# Patient Record
Sex: MALE | Race: WHITE | Employment: PART TIME | URBAN - METROPOLITAN AREA
[De-identification: names, ages, dates, MRNs, and addresses within clinical notes are randomized per-mention and may not be internally consistent; named-entity substitution may affect disease eponyms.]

---

## 2017-07-18 ENCOUNTER — OFFICE VISIT (OUTPATIENT)
Dept: ORTHOPEDIC SURGERY | Age: 22
End: 2017-07-18

## 2017-07-18 VITALS — BODY MASS INDEX: 25.74 KG/M2 | WEIGHT: 190.04 LBS | HEIGHT: 72 IN

## 2017-07-18 DIAGNOSIS — S43.102S: Primary | ICD-10-CM

## 2017-07-18 PROCEDURE — 99213 OFFICE O/P EST LOW 20 MIN: CPT | Performed by: ORTHOPAEDIC SURGERY

## 2017-07-18 PROCEDURE — 73020 X-RAY EXAM OF SHOULDER: CPT | Performed by: ORTHOPAEDIC SURGERY

## 2017-12-26 ENCOUNTER — TELEPHONE (OUTPATIENT)
Dept: ORTHOPEDIC SURGERY | Age: 22
End: 2017-12-26

## 2018-01-02 ENCOUNTER — OFFICE VISIT (OUTPATIENT)
Dept: ORTHOPEDIC SURGERY | Age: 23
End: 2018-01-02

## 2018-01-02 VITALS — HEIGHT: 72 IN | WEIGHT: 190.04 LBS | BODY MASS INDEX: 25.74 KG/M2

## 2018-01-02 DIAGNOSIS — S46.812A STRAIN OF LEFT TRAPEZIUS MUSCLE, INITIAL ENCOUNTER: ICD-10-CM

## 2018-01-02 DIAGNOSIS — M25.512 LEFT SHOULDER PAIN, UNSPECIFIED CHRONICITY: Primary | ICD-10-CM

## 2018-01-02 PROCEDURE — 99213 OFFICE O/P EST LOW 20 MIN: CPT | Performed by: PHYSICIAN ASSISTANT

## 2018-01-02 NOTE — PROGRESS NOTES
REFERRING PHYSICIAN: None    CHIEF COMPLAINT:  Left shoulder pain     HISTORY OF PRESENT ILLNESS:  Monik Cox a 45-year-old right-hand-dominant male presents today for evaluation of his left shoulder. He was last seen in the office on July 18, 2017, 1 year after a.c. joint reconstruction. He was doing well after that procedure until December 23, 2017 when he had a new injury while lifting weights. He reports that he was performing a bench press exercise and as he tried to place the  in the raft the bar fell and forced his left shoulder into extension and was unable abduction. Her hand night especially when lying on his left shoulder. He denies any numbness and tingling into his left hand. He has treated this with rest, ice, Advil, and activity modification and has had improvement of symptoms. REVIEW OF SYSTEMS:  Pertinent items are noted in the HPI. Review of systems was reviewed from Patient History Form dated on January 2, 2015 and available in the patient's chart under the Media tab. PHYSICAL EXAMINATION: Inspection of the left shoulder reveals warm, dry, intact skin. There is no adenopathy. There is a well-healed incision. The distal neurovascular exam is grossly intact. There is tenderness about the acromioclavicular joint. There is tenderness about the upper trapezius muscle. There is no tenderness over the coracoid. Range of motion reveals 110° of active forward elevation. He has difficulty position his hand behind his head. He has 20° of external rotation was arm the side. He has 4+ out of 5 strength in forward elevation and external rotation. He has discomfort and weakness with Whipple's test.  He has a negative belly press test.  He has a negative Yergason's test.  There is mild scapular winging with resisted range of motion. Examination of the contralateral shoulder reveals no atrophy or deformity. The skin is warm and dry. Range of motion is within normal limits.  There is

## 2018-01-04 ENCOUNTER — HOSPITAL ENCOUNTER (OUTPATIENT)
Dept: PHYSICAL THERAPY | Age: 23
Discharge: HOME | End: 2018-01-16
Admitting: ORTHOPAEDIC SURGERY

## 2018-01-04 NOTE — PLAN OF CARE
(30 days):   [x] Falls Risk assessed and no intervention required. [] Falls Risk assessed and Patient requires intervention due to being higher risk   TUG score (>12s at risk):     [] Falls education provided. G-Codes:  PT G-Codes  Functional Assessment Tool Used: Quick DASH   Score: 45%  Functional Limitation: Carrying, moving and handling objects  Carrying, Moving and Handling Objects Current Status (): At least 40 percent but less than 60 percent impaired, limited or restricted  Carrying, Moving and Handling Objects Goal Status (): At least 1 percent but less than 20 percent impaired, limited or restricted    ASSESSMENT:    Functional Impairments   []Noted spinal or UE joint hypomobility   []Noted spinal or UE joint hypermobility   [x]Decreased UE functional ROM   [x]Decreased UE functional strength   []Abnormal reflexes/sensation/myotomal/dermatomal deficits   [x]Decreased RC/scapular/core strength and neuromuscular control   []other:      Functional Activity Limitations (from functional questionnaire and intake)   []Reduced ability to tolerate prolonged functional positions   []Reduced ability or difficulty with changes of positions or transfers between positions   [x]Reduced ability to maintain good posture and demonstrate good body mechanics with sitting, bending, and lifting   [x] Reduced ability or tolerance with driving and/or computer work   [x]Reduced ability to sleep   [x]Reduced ability to perform lifting, reaching, carrying tasks   [x]Reduced ability to tolerate impact through UE   [x]Reduced ability to reach behind back   [x]Reduced ability to  or hold objects   [x]Reduced ability to throw or toss an object   []other:    Participation Restrictions   [x]Reduced participation in self care activities   [x]Reduced participation in home management activities   []Reduced participation in work activities   [x]Reduced participation in social activities.    [x]Reduced participation in

## 2018-01-04 NOTE — FLOWSHEET NOTE
Strength flexion 4 pain     ER      ABD 4     IR        RESTRICTIONS/PRECAUTIONS: history L AC joint reconstruction     Exercises/Interventions:   Therapeutic Ex Sets/reps Notes   UT stretch  1x5 20\"    Levator scap stretch  1x5 20\"    Lower cervical/mid thoracic stretch  1x5 20\"    Chin tuck  2x10                                                                                Manual Intervention     Strain/counterstrain L UT  10 min  Pt difficulty relaxing   L UT STM/trigger point release 10 min                              NMR re-education     Chin tuck  2x10                                          Pt ed: muscle strain, PT expectations, ice modality   Therapeutic Exercise and NMR EXR  [x] (08687) Provided verbal/tactile cueing for activities related to strengthening, flexibility, endurance, ROM  for improvements in scapular, scapulothoracic and UE control with self care, reaching, carrying, lifting, house/yardwork, driving/computer work.    [] (25193) Provided verbal/tactile cueing for activities related to improving balance, coordination, kinesthetic sense, posture, motor skill, proprioception  to assist with  scapular, scapulothoracic and UE control with self care, reaching, carrying, lifting, house/yardwork, driving/computer work. Therapeutic Activities:    [] (01428 or 69012) Provided verbal/tactile cueing for activities related to improving balance, coordination, kinesthetic sense, posture, motor skill, proprioception and motor activation to allow for proper function of scapular, scapulothoracic and UE control with self care, carrying, lifting, driving/computer work.      Home Exercise Program:    [x] (12778) Reviewed/Progressed HEP activities related to strengthening, flexibility, endurance, ROM of scapular, scapulothoracic and UE control with self care, reaching, carrying, lifting, house/yardwork, driving/computer work  [] (59457) Reviewed/Progressed HEP activities related to improving balance, coordination, kinesthetic sense, posture, motor skill, proprioception of scapular, scapulothoracic and UE control with self care, reaching, carrying, lifting, house/yardwork, driving/computer work      Manual Treatments:  PROM / STM / Oscillations-Mobs:  G-I, II, III, IV (PA's, Inf., Post.)  [x] (82137) Provided manual therapy to mobilize soft tissue/joints of cervical/CT, scapular GHJ and UE for the purpose of modulating pain, promoting relaxation,  increasing ROM, reducing/eliminating soft tissue swelling/inflammation/restriction, improving soft tissue extensibility and allowing for proper ROM for normal function with self care, reaching, carrying, lifting, house/yardwork, driving/computer work    Modalities:  CP/PMx 15 min     Charges:  Timed Code Treatment Minutes: 35   Total Treatment Minutes: 70     [x] EVAL (LOW) 88309 (typically 20 minutes face-to-face)  [] EVAL (MOD) 72915 (typically 30 minutes face-to-face)  [] EVAL (HIGH) 79617 (typically 45 minutes face-to-face)  [] RE-EVAL     [x] RL(22822) x  1   [] IONTO  [] NMR (79489) x      [] VASO  [x] Manual (22611) x  1    [] Other:  [] TA x       [] Mech Traction (53486)  [] ES(attended) (26787)      [x] ES (un) (78309):     GOALS:  Patient stated goal: Pt would like to return to playing sports and weight lifting with decreased pain and limitations from L shoulder    Therapist goals for Patient:   Short Term Goals: To be achieved in: 2 weeks  1. Independent in HEP and progression per patient tolerance, in order to prevent re-injury. 2. Patient will have a decrease in pain to facilitate improvement in movement, function, and ADLs as indicated by Functional Deficits. Long Term Goals: To be achieved in: 8 weeks   1. Disability index score of 20% or less for the DASH to assist with reaching prior level of function.    2. Patient will demonstrate increased L shoulder AROM to WNL without pain  to allow for proper joint functioning as indicated by patients Functional Deficits. 3. Patient will demonstrate an increase in Strength to 5/5 MMT in L UE to allow for proper functional mobility as indicated by patients Functional Deficits. 4. Patient will return to driving and sleeping  functional activities without increased symptoms or restriction. 5. PAtient will return to weight lifting and sports with decreased pain and limitations from his L shoulder. New or Updated Goals (if applicable):  [x] No change to goals established upon initial eval/last progress note:  New Goals:    Progression Towards Functional goals:   [] Patient is progressing as expected towards functional goals listed. [] Progression is slowed due to complexities listed. [] Progression has been slowed due to co-morbidities.   [x] Plan just implemented, too soon to assess goals progression  [] Other:     ASSESSMENT:    [] Improvement noted relative to goals:  [] No Improvement noted related to goals:  Summary/Patient's response to treatment: See Eval    Treatment/Activity Tolerance:  [x] Patient tolerated treatment well [] Patient limited by fatique  [] Patient limited by pain  [] Patient limited by other medical complications  [] Other:     Prognosis: [x] Good [] Fair  [] Poor    Patient Requires Follow-up: [x] Yes  [] No    PLAN: See eval  [] Continue per plan of care [] Alter current plan (see comments)  [x] Plan of care initiated [] Hold pending MD visit [] Discharge    Electronically signed by: Annita Bertrand PT

## 2018-01-10 ENCOUNTER — HOSPITAL ENCOUNTER (OUTPATIENT)
Dept: PHYSICAL THERAPY | Age: 23
Discharge: HOME | End: 2018-01-10
Admitting: ORTHOPAEDIC SURGERY

## 2018-01-10 NOTE — FLOWSHEET NOTE
[facility], [address]  Phone: [phone]  Fax [phone]    Physical Therapy Daily Treatment Note  Date:  1/10/2018    Patient Name:  Kareem Hensley    :  1995  MRN: [id number]  Restrictions/Precautions:    Physician Information:    Referring Practitioner: Dr. Gunner Evans   Medical/Treatment Diagnosis Information:    Diagnosis: M25.512 left shoulder pain, S46.812 strain of left trapezius   · Treatment Diagnosis: M62.9 disorder of muscular tightness, M25.512 left shoulder pain                 [x] Conservative / [] Surgical - DOS:  Therapy Diagnosis/Practice Pattern:  Practice Pattern D: Connective Tissue Dysfunction  Insurance/Certification information:     Plan of care signed: [x] YES  [] NO  Number of Comorbidities:  []0     [x]1-2    []3+  Date of Patient follow up with Physician:     G-Code (if applicable):      Date G-Code Applied:  18       Progress Note: []  Yes  [x]  No  Next due by: Visit #10        Latex Allergy:  [x]NO      []YES  Preferred Language for Healthcare:   [x]English       []other:    Visit # Insurance Allowable Reporting Period   2 ? Begin Date: 1/10/2018               End Date:      RECERT DUE BY: 92     SUBJECTIVE:  Pt reports that shoulder still feels sore. Reports that he still has some discomfort putting on shirt.      OBJECTIVE:   Observation: Pt sits with FHP and bilat shoulder IR  Palpation:     Test used Initial score Current Score   Pain Summary VAS 8/10     Functional questionnaire QDash 45%    ROM flexion 170 L/175 R     ER C4 L/C7 R      pain L/170 R    Strength flexion 4 pain     ER      ABD 4     IR        RESTRICTIONS/PRECAUTIONS: history L AC joint reconstruction     Exercises/Interventions:   Therapeutic Ex Sets/reps Notes   UT stretch  1x5 20\"    Levator scap stretch  1x5 20\"    Lower cervical/mid thoracic stretch  1x5 20\"    Chin tuck  2x10          No decreased pain and limitations from his L shoulder. New or Updated Goals (if applicable):  [x] No change to goals established upon initial eval/last progress note:  New Goals:    Progression Towards Functional goals:   [] Patient is progressing as expected towards functional goals listed. [] Progression is slowed due to complexities listed. [] Progression has been slowed due to co-morbidities. [x] Plan just implemented, too soon to assess goals progression  [] Other:     ASSESSMENT:    [] Improvement noted relative to goals:  [] No Improvement noted related to goals:  Summary/Patient's response to treatment: Pt tolerated MT well displaying muscle release/relaxation post this. He reported decreased pain levels post PT session. He is progressing with PT however he is not at his PLOF. He returns to [place] next week. Continue PT to decrease muscle tightness and pain levels and increase flexibility ROM and strength.      Treatment/Activity Tolerance:  [x] Patient tolerated treatment well [] Patient limited by fatique  [] Patient limited by pain  [] Patient limited by other medical complications  [] Other:     Prognosis: [x] Good [] Fair  [] Poor    Patient Requires Follow-up: [x] Yes  [] No    PLAN: See eval  [x] Continue per plan of care [] Alter current plan (see comments)  [] Plan of care initiated [] Hold pending MD visit [] Discharge    Electronically signed by: Shlomo Bashir PT

## 2018-01-12 ENCOUNTER — HOSPITAL ENCOUNTER (OUTPATIENT)
Dept: PHYSICAL THERAPY | Age: 23
Discharge: HOME | End: 2018-01-12
Admitting: ORTHOPAEDIC SURGERY

## 2018-01-12 NOTE — FLOWSHEET NOTE
[facility], [address] [place] [address]  Phone: [phone]  Fax [phone]    Physical Therapy Daily Treatment Note  Date:  2018    Patient Name:  Citlaly John    :  1995  MRN: [id number]  Restrictions/Precautions:    Physician Information:    Referring Practitioner: Dr. Gela Beltran   Medical/Treatment Diagnosis Information:    Diagnosis: M25.512 left shoulder pain, S46.812 strain of left trapezius   · Treatment Diagnosis: M62.9 disorder of muscular tightness, M25.512 left shoulder pain                 [x] Conservative / [] Surgical - DOS:  Therapy Diagnosis/Practice Pattern:  Practice Pattern D: Connective Tissue Dysfunction  Insurance/Certification information:     Plan of care signed: [x] YES  [] NO  Number of Comorbidities:  []0     [x]1-2    []3+  Date of Patient follow up with Physician:     G-Code (if applicable):      Date G-Code Applied:  18       Progress Note: []  Yes  [x]  No  Next due by: Visit #10        Latex Allergy:  [x]NO      []YES  Preferred Language for Healthcare:   [x]English       []other:    Visit # Insurance Allowable Reporting Period   3 ? Begin Date: 2018               End Date:      RECERT DUE BY: 16     SUBJECTIVE:  Pt reports that his pain is on average 1/10 when his arm is at rest, however if he lifts anything he has 6/10 pain.      OBJECTIVE:   Observation: Pt sits with FHP and bilat shoulder IR  Palpation:     Test used Initial score Current Score   Pain Summary VAS 8/10     Functional questionnaire QDash 45%    ROM flexion 170 L/175 R     ER C4 L/C7 R      pain L/170 R    Strength flexion 4 pain     ER      ABD 4     IR        RESTRICTIONS/PRECAUTIONS: history L AC joint reconstruction     Exercises/Interventions:   Therapeutic Ex Sets/reps Notes   UT stretch  1x5 20\"    Levator scap stretch  1x5 20\"    Lower cervical/mid thoracic stretch  1x5 20\"    Chin tuck Oscillations-Mobs:  G-I, II, III, IV (PA's, Inf., Post.)  [x] (36626) Provided manual therapy to mobilize soft tissue/joints of cervical/CT, scapular GHJ and UE for the purpose of modulating pain, promoting relaxation,  increasing ROM, reducing/eliminating soft tissue swelling/inflammation/restriction, improving soft tissue extensibility and allowing for proper ROM for normal function with self care, reaching, carrying, lifting, house/yardwork, driving/computer work    Modalities:  MHP/PMx 15 min     Charges:  Timed Code Treatment Minutes: 45   Total Treatment Minutes: 60     [] EVAL (LOW) 18001 (typically 20 minutes face-to-face)  [] EVAL (MOD) 04224 (typically 30 minutes face-to-face)  [] EVAL (HIGH) 17888 (typically 45 minutes face-to-face)  [] RE-EVAL     [x] OE(04281) x  1   [] IONTO  [] NMR (51625) x      [] VASO  [x] Manual (96827) x  2    [] Other:  [] TA x       [] Mech Traction (29024)  [] ES(attended) (96707)      [x] ES (un) (63105):     GOALS:  Patient stated goal: Pt would like to return to playing sports and weight lifting with decreased pain and limitations from L shoulder    Therapist goals for Patient:   Short Term Goals: To be achieved in: 2 weeks  1. Independent in HEP and progression per patient tolerance, in order to prevent re-injury. 2. Patient will have a decrease in pain to facilitate improvement in movement, function, and ADLs as indicated by Functional Deficits. Long Term Goals: To be achieved in: 8 weeks   1. Disability index score of 20% or less for the DASH to assist with reaching prior level of function. 2. Patient will demonstrate increased L shoulder AROM to WNL without pain  to allow for proper joint functioning as indicated by patients Functional Deficits. 3. Patient will demonstrate an increase in Strength to 5/5 MMT in L UE to allow for proper functional mobility as indicated by patients Functional Deficits.    4. Patient will return to driving and sleeping  functional

## 2018-01-15 ENCOUNTER — HOSPITAL ENCOUNTER (OUTPATIENT)
Dept: PHYSICAL THERAPY | Age: 23
Discharge: HOME | End: 2018-01-15
Admitting: ORTHOPAEDIC SURGERY

## 2018-01-15 NOTE — FLOWSHEET NOTE
[facility], [address] [place] [address]  Phone: [phone]  Fax [phone]    Physical Therapy Daily Treatment Note  Date:  1/15/2018    Patient Name:  Radames Velasquez    :  1995  MRN: [id number]  Restrictions/Precautions:    Physician Information:    Referring Practitioner: Dr. Letitia Najjar   Medical/Treatment Diagnosis Information:    Diagnosis: M25.512 left shoulder pain, S46.812 strain of left trapezius   · Treatment Diagnosis: M62.9 disorder of muscular tightness, M25.512 left shoulder pain                 [x] Conservative / [] Surgical - DOS:  Therapy Diagnosis/Practice Pattern:  Practice Pattern D: Connective Tissue Dysfunction  Insurance/Certification information:     Plan of care signed: [x] YES  [] NO  Number of Comorbidities:  []0     [x]1-2    []3+  Date of Patient follow up with Physician:     G-Code (if applicable):      Date G-Code Applied:  18       Progress Note: []  Yes  [x]  No  Next due by: Visit #10        Latex Allergy:  [x]NO      []YES  Preferred Language for Healthcare:   [x]English       []other:    Visit # Insurance Allowable Reporting Period   4 ? Begin Date: 1/15/2018               End Date:      RECERT DUE BY: 3/3/14     SUBJECTIVE:  Pt reports that it no longer hurts to put on a shirt. Pt returning to school in two days. Pt reports that he has not tried lifting anything.    OBJECTIVE:   Observation: Pt sits with FHP and bilat shoulder IR  Palpation:     Test used Initial score Current Score   Pain Summary VAS 8/10  2/10 with putting on shirt    Functional questionnaire QDash 45%    ROM flexion 170 L/175 R     ER C4 L/C7 R      pain L/170 R    Strength flexion 4 pain     ER      ABD 4     IR        RESTRICTIONS/PRECAUTIONS: history L AC joint reconstruction     Exercises/Interventions:   Therapeutic Ex Sets/reps Notes   UT stretch  1x5 20\"    Levator scap stretch  1x5 20\"    Lower Discharge    Electronically signed by: Mortimer Ferdinand, PT

## 2018-01-16 ENCOUNTER — HOSPITAL ENCOUNTER (OUTPATIENT)
Dept: PHYSICAL THERAPY | Age: 23
Discharge: HOME | End: 2018-01-16
Admitting: ORTHOPAEDIC SURGERY

## 2018-01-16 NOTE — FLOWSHEET NOTE
2. Patient will demonstrate increased L shoulder AROM to WNL without pain  to allow for proper joint functioning as indicated by patients Functional Deficits. 3. Patient will demonstrate an increase in Strength to 5/5 MMT in L UE to allow for proper functional mobility as indicated by patients Functional Deficits. 4. Patient will return to driving and sleeping  functional activities without increased symptoms or restriction. 5. PAtient will return to weight lifting and sports with decreased pain and limitations from his L shoulder. New or Updated Goals (if applicable):  [x] No change to goals established upon initial eval/last progress note:  New Goals:    Progression Towards Functional goals:   [] Patient is progressing as expected towards functional goals listed. [] Progression is slowed due to complexities listed. [] Progression has been slowed due to co-morbidities. [x] Plan just implemented, too soon to assess goals progression  [] Other:     ASSESSMENT:    [] Improvement noted relative to goals:  [] No Improvement noted related to goals:  Summary/Patient's response to treatment: Pt tolerated MT well; nil TrPs or TTP noted. Decreased mobility noted at CT junction; improved following seated thoracic manipulation /c audible cavitation. Pt req t/c & v/c for incline push-ups 2/2 poor form I.e. Upper c/s protraction, scapular winging. Pt educated on importance of continuing /c HEP while away at school & to avoid painful activities.      Treatment/Activity Tolerance:  [x] Patient tolerated treatment well [] Patient limited by fatique  [] Patient limited by pain  [] Patient limited by other medical complications  [] Other:     Prognosis: [x] Good [] Fair  [] Poor    Patient Requires Follow-up: [x] Yes  [] No    PLAN: See eval  [x] Continue per plan of care [] Alter current plan (see comments)  [] Plan of care initiated [] Hold pending MD visit [] Discharge    Electronically signed by: Hilda Coon

## 2018-01-16 NOTE — DISCHARGE SUMMARY
Training     []Cervical Traction    [] Lumbar Traction  [x] Neuromuscular Re-education [x] Cold/hotpack         []Iontophoresis  [x] Instruction in HEP      Other:  [x] Manual Therapy                   []                       ?           []   Assessment:   [] All Goals were achieved. [x] The following goals were achieved (#'s):  [] The following goals were not achieved for the following reasons:  Summary/assessmen of improvement as it relates to each goal: Pt improving /c L shoulder AROM & strength. Pt continues to demonstrate fwd head & rounded shoulders in static sitting/standing. Pt educated to continue /c HEP to improve scapular control & muscular endurance, and maintain flexibility of tissues. Plan of Care:  [x] Discharge from Therapy Services due to: Long Term Goals: To be achieved in: 8 weeks   1. Disability index score of 20% or less for the DASH to assist with reaching prior level of function. Not Met.  2. Patient will demonstrate increased L shoulder AROM to WNL without pain  to allow for proper joint functioning as indicated by patients Functional Deficits. Not yet met; AROM improving. 3. Patient will demonstrate an increase in Strength to 5/5 MMT in L UE to allow for proper functional mobility as indicated by patients Functional Deficits. Progressing towards, not yet met. 4. Patient will return to driving and sleeping  functional activities without increased symptoms or restriction. GOAL MET  5. PAtient will return to weight lifting and sports with decreased pain and limitations from his L shoulder. Progressing towards goals.      Reason for Discharge:   [] All goals achieved    [] Patient having surgery  [] Physician discontinued therapy  [] Insurance/Financial Limitations [] Patient did not return for follow up visits [] Home program/1 visit only   [] No subjective or objective improvement [] Plateaued   [] Patient was unable to adhere to the plan of care established at evaluation.  [] Referred back to physician for re-evaluation and did not return. [x] Other:?[place]; unable to continue therapy; D/C to HEP. Electronically signed by:  Ronald Snider PT  Glenn Gomez, SPT  Therapist was present, directed the patient's care, made skilled judgement, and was responsible for assessment and treatment of the patient.